# Patient Record
Sex: MALE | Race: OTHER | Employment: PART TIME | URBAN - METROPOLITAN AREA
[De-identification: names, ages, dates, MRNs, and addresses within clinical notes are randomized per-mention and may not be internally consistent; named-entity substitution may affect disease eponyms.]

---

## 2018-02-26 NOTE — PROGRESS NOTES
2nd attempt Rings and rings then goes to a fast busy signal.    Letter send via [employer]

## 2023-03-23 ENCOUNTER — TELEPHONE (OUTPATIENT)
Dept: INTERNAL MEDICINE CLINIC | Facility: CLINIC | Age: 68
End: 2023-03-23

## 2023-03-27 ENCOUNTER — PATIENT OUTREACH (OUTPATIENT)
Dept: CASE MANAGEMENT | Age: 68
End: 2023-03-27

## 2023-03-27 NOTE — PROCEDURES
The office order for PCP removal request is Approved and finalized on March 27, 2023.     Thanks,  [facility] [employer]

## 2024-12-08 NOTE — PROGRESS NOTES
Has not been seen in office since June of 2016. Is overdue for OV for CPE. We can discuss need for colonoscopy at [address] Please try to schedule, thanks. 2 seconds or less